# Patient Record
(demographics unavailable — no encounter records)

---

## 2025-06-27 NOTE — HISTORY OF PRESENT ILLNESS
[de-identified] : Pt is a 24F with PMH hypothyroidism who presents for an annual physical and follow up of hypothyroidism. No symptoms today. Pt has not taken her levothyroxine in 2 years due to lapse in insurance.

## 2025-06-27 NOTE — PHYSICAL EXAM
[TextEntry] : Constitutional:  No acute distress, well nourished, well developed, and well-appearing Head:  Atraumatic and normocephalic Eyes:  Normal sclera/conjunctiva, pupils equal round and reactive to light and accommodation, extraocular movements intact ENT:  Outer ears and nose normal in appearance, tympanic membranes intact bilaterally without erythema, exudate, or visible fluid, no impacted cerumen, nares patent with no erythema or exudates, and the oropharynx was normal, posterior pharynx without erythema, exudates, or masses Neck:  No jugular venous distention, no cervical or supraclavicular lymphadenopathy, neck is supple and the thyroid was normal, no thyromegaly or thyroid nodules, no palpable masses Pulmonary:  No respiratory distress, no accessory muscle use, lungs were clear to auscultation bilaterally, no rales, rhonchi, crackles, or wheezing Cardiac:  Normal rate,  regular rhythm, normal S1 S2 and no murmurs, rubs, or gallops heard Vascular:  No peripheral edema Abdomen:  All 4 quadrants of abdomen are soft, nontender without masses, nondistended, no visible hernias Back:  No CVA tenderness and no spinal tenderness Musculoskeletal:  No joint swelling and grossly normal strength/tone. No edema in bilateral arms or legs, pt moving all 4 extremities spontaneously Skin:  No rash Neurology:  Coordination grossly intact, no focal deficits and normal gait. Cranial Nerves II-XII grossly intact, facial expressions are symmetric Psychiatric:  The affect was normal and insight and judgment were intact

## 2025-06-27 NOTE — PLAN
[FreeTextEntry1] : #HCM - Check labs as above - Offered STD screening, pt declines as not sexually active in the past 1 year - Pt will do bloodwork tomorrow fasting  #Hypothyroidism - Resume levothyroxine, compliance discussed - Checking labs above - F/u in 6 weeks after resuming medication